# Patient Record
Sex: MALE | Race: ASIAN | NOT HISPANIC OR LATINO | URBAN - METROPOLITAN AREA
[De-identification: names, ages, dates, MRNs, and addresses within clinical notes are randomized per-mention and may not be internally consistent; named-entity substitution may affect disease eponyms.]

---

## 2020-11-05 ENCOUNTER — EMERGENCY (EMERGENCY)
Facility: HOSPITAL | Age: 30
LOS: 1 days | Discharge: HOME | End: 2020-11-05
Attending: EMERGENCY MEDICINE | Admitting: EMERGENCY MEDICINE
Payer: MEDICAID

## 2020-11-05 VITALS
DIASTOLIC BLOOD PRESSURE: 75 MMHG | HEART RATE: 90 BPM | SYSTOLIC BLOOD PRESSURE: 135 MMHG | RESPIRATION RATE: 16 BRPM | TEMPERATURE: 98 F | OXYGEN SATURATION: 100 %

## 2020-11-05 VITALS
SYSTOLIC BLOOD PRESSURE: 164 MMHG | HEART RATE: 89 BPM | OXYGEN SATURATION: 100 % | DIASTOLIC BLOOD PRESSURE: 110 MMHG | RESPIRATION RATE: 18 BRPM

## 2020-11-05 LAB
ALBUMIN SERPL ELPH-MCNC: 4.6 G/DL — SIGNIFICANT CHANGE UP (ref 3.3–5)
ALP SERPL-CCNC: 109 U/L — SIGNIFICANT CHANGE UP (ref 40–120)
ALT FLD-CCNC: 25 U/L — SIGNIFICANT CHANGE UP (ref 4–41)
ANION GAP SERPL CALC-SCNC: 12 MMO/L — SIGNIFICANT CHANGE UP (ref 7–14)
AST SERPL-CCNC: 22 U/L — SIGNIFICANT CHANGE UP (ref 4–40)
BASOPHILS # BLD AUTO: 0.04 K/UL — SIGNIFICANT CHANGE UP (ref 0–0.2)
BASOPHILS NFR BLD AUTO: 0.4 % — SIGNIFICANT CHANGE UP (ref 0–2)
BILIRUB SERPL-MCNC: 0.4 MG/DL — SIGNIFICANT CHANGE UP (ref 0.2–1.2)
BUN SERPL-MCNC: 8 MG/DL — SIGNIFICANT CHANGE UP (ref 7–23)
CALCIUM SERPL-MCNC: 9.4 MG/DL — SIGNIFICANT CHANGE UP (ref 8.4–10.5)
CHLORIDE SERPL-SCNC: 106 MMOL/L — SIGNIFICANT CHANGE UP (ref 98–107)
CO2 SERPL-SCNC: 23 MMOL/L — SIGNIFICANT CHANGE UP (ref 22–31)
CREAT SERPL-MCNC: 0.86 MG/DL — SIGNIFICANT CHANGE UP (ref 0.5–1.3)
EOSINOPHIL # BLD AUTO: 0.01 K/UL — SIGNIFICANT CHANGE UP (ref 0–0.5)
EOSINOPHIL NFR BLD AUTO: 0.1 % — SIGNIFICANT CHANGE UP (ref 0–6)
GLUCOSE SERPL-MCNC: 117 MG/DL — HIGH (ref 70–99)
HCT VFR BLD CALC: 44.8 % — SIGNIFICANT CHANGE UP (ref 39–50)
HGB BLD-MCNC: 14.8 G/DL — SIGNIFICANT CHANGE UP (ref 13–17)
IMM GRANULOCYTES NFR BLD AUTO: 0.6 % — SIGNIFICANT CHANGE UP (ref 0–1.5)
LYMPHOCYTES # BLD AUTO: 1.96 K/UL — SIGNIFICANT CHANGE UP (ref 1–3.3)
LYMPHOCYTES # BLD AUTO: 18 % — SIGNIFICANT CHANGE UP (ref 13–44)
MCHC RBC-ENTMCNC: 27.9 PG — SIGNIFICANT CHANGE UP (ref 27–34)
MCHC RBC-ENTMCNC: 33 % — SIGNIFICANT CHANGE UP (ref 32–36)
MCV RBC AUTO: 84.5 FL — SIGNIFICANT CHANGE UP (ref 80–100)
MONOCYTES # BLD AUTO: 0.69 K/UL — SIGNIFICANT CHANGE UP (ref 0–0.9)
MONOCYTES NFR BLD AUTO: 6.3 % — SIGNIFICANT CHANGE UP (ref 2–14)
NEUTROPHILS # BLD AUTO: 8.14 K/UL — HIGH (ref 1.8–7.4)
NEUTROPHILS NFR BLD AUTO: 74.6 % — SIGNIFICANT CHANGE UP (ref 43–77)
NRBC # FLD: 0 K/UL — SIGNIFICANT CHANGE UP (ref 0–0)
PLATELET # BLD AUTO: 444 K/UL — HIGH (ref 150–400)
PMV BLD: 8.7 FL — SIGNIFICANT CHANGE UP (ref 7–13)
POTASSIUM SERPL-MCNC: 4.3 MMOL/L — SIGNIFICANT CHANGE UP (ref 3.5–5.3)
POTASSIUM SERPL-SCNC: 4.3 MMOL/L — SIGNIFICANT CHANGE UP (ref 3.5–5.3)
PROT SERPL-MCNC: 7.8 G/DL — SIGNIFICANT CHANGE UP (ref 6–8.3)
RBC # BLD: 5.3 M/UL — SIGNIFICANT CHANGE UP (ref 4.2–5.8)
RBC # FLD: 13.1 % — SIGNIFICANT CHANGE UP (ref 10.3–14.5)
SODIUM SERPL-SCNC: 141 MMOL/L — SIGNIFICANT CHANGE UP (ref 135–145)
WBC # BLD: 10.9 K/UL — HIGH (ref 3.8–10.5)
WBC # FLD AUTO: 10.9 K/UL — HIGH (ref 3.8–10.5)

## 2020-11-05 PROCEDURE — 99283 EMERGENCY DEPT VISIT LOW MDM: CPT

## 2020-11-05 RX ORDER — BUPRENORPHINE AND NALOXONE 2; .5 MG/1; MG/1
2 TABLET SUBLINGUAL ONCE
Refills: 0 | Status: DISCONTINUED | OUTPATIENT
Start: 2020-11-05 | End: 2020-11-05

## 2020-11-05 RX ORDER — SODIUM CHLORIDE 9 MG/ML
1000 INJECTION INTRAMUSCULAR; INTRAVENOUS; SUBCUTANEOUS ONCE
Refills: 0 | Status: COMPLETED | OUTPATIENT
Start: 2020-11-05 | End: 2020-11-05

## 2020-11-05 RX ADMIN — BUPRENORPHINE AND NALOXONE 2 TABLET(S): 2; .5 TABLET SUBLINGUAL at 19:29

## 2020-11-05 RX ADMIN — SODIUM CHLORIDE 1000 MILLILITER(S): 9 INJECTION INTRAMUSCULAR; INTRAVENOUS; SUBCUTANEOUS at 18:24

## 2020-11-05 NOTE — ED PROVIDER NOTE - PHYSICAL EXAMINATION
Gen: AAOx3, non-toxic  Head: NCAT  HEENT: EOMI, oral mucosa moist, normal conjunctiva  Lung: CTAB, no respiratory distress, no wheezes/rhonchi/rales B/L, speaking in full sentences  CV: RRR, no murmurs, rubs or gallops  Abd: soft, NTND, no guarding  MSK: no visible deformities  Neuro: No focal sensory or motor deficits  Skin: Warm, well perfused, no rash  Psych: anxious appearing  ~Chadd Martha PGY3 Gen: AAOx3, non-toxic  Head: NCAT  HEENT: EOMI, oral mucosa moist, normal conjunctiva  Lung: CTAB, no respiratory distress, no wheezes/rhonchi/rales B/L, speaking in full sentences  CV: RRR, no murmurs, rubs or gallops  Abd: soft, NTND, no guarding  MSK: no visible deformities  Neuro: No focal sensory or motor deficits  Skin: Warm, well perfused, no rash  Psych: anxious appearing  ~Chadd Martha PGY3    Attending/Sherlyn: NAD; PERRL/EOMI, non-icterus, supple, no CARA, no JVD, RRR, CTAB; Abd-soft, NT/ND, no HSM; no LE edema, A&Ox3, nonfocal; Skin-warm/dry

## 2020-11-05 NOTE — ED PROVIDER NOTE - PATIENT PORTAL LINK FT
You can access the FollowMyHealth Patient Portal offered by [facility] by registering at the following website: [email or web address]. By joining [employer]’s FollowMyHealth portal, you will also be able to view your health information using other applications (apps) compatible with our system.

## 2020-11-05 NOTE — ED PROVIDER NOTE - NSFOLLOWUPINSTRUCTIONS_ED_ALL_ED_FT
You initiated suboxone therapy today in the ED, without complication.    You should follow up with the suboxone clinic, discussed with the SBIRT nurse today.  Appointment was made and details arranged in the ED.    If you have any severe increase in pain, fever, uncontrollable nausea or vomiting, or inability to tolerate eating and drinking you need to immediately return to the emergency room.

## 2020-11-05 NOTE — ED ADULT NURSE NOTE - OBJECTIVE STATEMENT
Pt arrived to room 29 A&Ox4 ambulatory at baseline c/o opiate withdrawal. Pt denies any significant PMHx. Pt states he took oxy yesterday and has been snorting crushed homemade fentanyl pills. Pt states he took 15-20 fentanyl pills 2 days ago. Pt endorsing nausea without vomiting, chills. Mild tremors noted to upper extremities. RR even and unlabored, pallor/diaphoresis not noted. NSR on cardiac monitor. Pt denies anxiety, hallucinations, itching, tingling, CP, SOB, HA. IV established with 20G in LAC. Labs drawn and sent. MD at bedside, will continue to monitor.

## 2020-11-05 NOTE — SBIRT NOTE ADULT - NSSBIRTDRGACTIVEREFTXDET_GEN_A_CORE
Provided SBIRT services: Full screen positive. Referral to Treatment Performed. Screening results were reviewed with the patient and patient was provided information about healthy guidelines and potential negative consequences associated with level of risk. Motivation and readiness to reduce or stop use was discussed and goals and activities to make changes were suggested/offered.  Referral for complete assessment and level of care determination at a certified treatment facility was completed by contacting the treatment facility via phone, and add apt info as noted below:

## 2020-11-05 NOTE — SBIRT NOTE ADULT - NSSBIRTDRGNAMETXFAC_GEN_A_CORE
[facility]

## 2020-11-05 NOTE — SBIRT NOTE ADULT - NSSBIRTDRGADDRESSTXFAC_GEN_A_CORE
Problem: Pain  Goal: #Acceptable pain level achieved/maintained at rest using NRS/Faces  This goal is used for patients who can self-report.  Acceptable means the level is at or below the identified comfort/function goal.  Outcome: Outcome Not Met, Continue to Monitor  Tylenol given for patient reports of pain.        [phone] [place] [id number], [place] [address]

## 2020-11-05 NOTE — ED PROVIDER NOTE - NS ED ROS FT
Gen: No fever, + chills  Eyes: No vision changes, eye irritation or discharge  ENT: No congestion, sore throat  Resp: No cough or SOB  Cardiovascular: No chest pain or palpitations  GI: No abdominal pain, nausea/vomiting. +diarrhea  :  No change in urine output or frequency; no dysuria  MS: No joint or muscle pain  Skin: No rashes  Neuro: +dizziness No headache; No numbness or weakness  Remainder negative, except as per the HPI

## 2020-11-05 NOTE — ED PROVIDER NOTE - PROGRESS NOTE DETAILS
Chadd Thomas, PGY-3: evaluated by SBIRT, patient agrees to suboxone treatment. Will get first treatment in the ED, and reassess after 1st dose. SBIRT discussed with patients brother and he will be able to bring to [facility] tomorrow. SBIRT will give Narcan kit. DAVID Isbell MD - PGY-2: Took over care of patient at 19:11, signed out as above from Dr. Thomas.  SBIRT recommended observation for approximately 30 minutes after administration of suboxone to see if need for second dose administration.  Patient reassessed, said that now (15 minutes post suboxone administration) he feels much improved, no more shaking and his anxiety is much better.  He is very excited to go to clinic tomorrow and get help he needs.  Appreciative of getting connected with resources and "wants to get out of this life".

## 2020-11-05 NOTE — ED ADULT NURSE NOTE - NSIMPLEMENTINTERV_GEN_ALL_ED
Implemented All Universal Safety Interventions:  [place] to call system. Call bell, personal items and telephone within reach. Instruct patient to call for assistance. Room bathroom lighting operational. Non-slip footwear when patient is off stretcher. Physically safe environment: no spills, clutter or unnecessary equipment. Stretcher in lowest position, wheels locked, appropriate side rails in place.

## 2020-11-05 NOTE — ED PROVIDER NOTE - OBJECTIVE STATEMENT
30y male with PMH of opiate abuse presenting with opiate withdrawal. States that he has been snorting fentanyl ~15 pills a day. Last used fentanyl 2 days ago, used oxycodone 20mg just over 24 hours ago. Never injected. Smokes marijuana, no alcohol. Feels chills, 1 episode of diarrhea, insomnia, anxiety. 30y male with PMH of opiate abuse presenting with opiate withdrawal. States that he has been snorting fentanyl ~15 pills a day. Last used fentanyl 2 days ago, used oxycodone 20mg just over 24 hours ago. Never injected. Smokes marijuana, no alcohol. Feels chills, 1 episode of diarrhea, insomnia, anxiety.    Attending/Sherlyn: 31 yo M as described above, h/o Opioid abuse (inhaled/oral, no IVDU), for the past ~1.5 years 30y male with PMH of opiate abuse presenting with opiate withdrawal. States that he has been snorting fentanyl ~15 pills a day. Last used fentanyl 2 days ago, used oxycodone 20mg just over 24 hours ago. Never injected. Smokes marijuana, no alcohol. Feels chills, 1 episode of diarrhea, insomnia, anxiety.    Attending/Sherlyn: 31 yo M as described above, h/o Opioid abuse (inhaled/oral, no IVDU), for the past ~1.5 years today experienced withdrawal symptoms including anxiety, diaphoresis, chills, and diarrhea. Desires to enter a rehab program.

## 2020-11-05 NOTE — ED ADULT TRIAGE NOTE - CHIEF COMPLAINT QUOTE
Pt states that he is withdrawing from Opiates.  Pt states that he was snorting Fentanyl, and stopped doing that 2 days ago.  Pt states that he last took Oxy 20mg po yesterday.  Pt c/o feeling anxious, nauseous.  Pt wants to stop using and go to rehab.  Pt denies SI/HI, pt calm and cooperative

## 2021-08-02 ENCOUNTER — OUTPATIENT (OUTPATIENT)
Dept: OUTPATIENT SERVICES | Facility: HOSPITAL | Age: 31
LOS: 1 days | Discharge: HOME | End: 2021-08-02
